# Patient Record
Sex: MALE
[De-identification: names, ages, dates, MRNs, and addresses within clinical notes are randomized per-mention and may not be internally consistent; named-entity substitution may affect disease eponyms.]

---

## 2019-01-01 ENCOUNTER — HOSPITAL ENCOUNTER (EMERGENCY)
Dept: HOSPITAL 31 - C.ER | Age: 44
Discharge: HOME | End: 2019-01-01
Payer: MEDICAID

## 2019-01-01 VITALS — SYSTOLIC BLOOD PRESSURE: 113 MMHG | DIASTOLIC BLOOD PRESSURE: 74 MMHG | HEART RATE: 87 BPM

## 2019-01-01 VITALS — TEMPERATURE: 97.3 F

## 2019-01-01 VITALS — RESPIRATION RATE: 18 BRPM

## 2019-01-01 DIAGNOSIS — F20.9: ICD-10-CM

## 2019-01-01 DIAGNOSIS — F19.10: Primary | ICD-10-CM

## 2019-01-01 DIAGNOSIS — F31.9: ICD-10-CM

## 2019-01-01 DIAGNOSIS — E78.5: ICD-10-CM

## 2019-01-01 NOTE — C.PDOC
History Of Present Illness


43 year old male is brought in by EMS after being found unconscious at his 

mother's house PTA. As per family, who is present in the ED, patient was found 

unconscious in the bathroom with a heroin needle in his arm. Patient has a 

history of heroin abuse. 


Time Seen by Provider: 01/01/19 13:39


Chief Complaint (Nursing): Substance Abuse


History Per: Family


History/Exam Limitations: clinical condition


Onset/Duration Of Symptoms: Hrs


Current Symptoms Are (Timing): Still Present


Modifying Factor(s): Other (heroin)





Past Medical History


Reviewed: Historical Data, Nursing Documentation, Vital Signs


Vital Signs: 





                                Last Vital Signs











Temp  97.3 F L  01/01/19 13:33


 


Pulse  83   01/01/19 15:47


 


Resp  18   01/01/19 15:47


 


BP  107/69   01/01/19 15:47


 


Pulse Ox  96   01/01/19 15:47














- Medical History


PMH: Asthma, Bipolar Disorder, Depression, Hyperlipidemia, Schizophrenia, 

Seizures


   Denies: Chronic Kidney Disease


Surgical History: No Surg Hx





- [facility] Procedures











GROUP PSYCHOTHERAPY (01/19/16)


MEDICATION MANAGEMENT (01/19/16)








Family History: States: No Known Family Hx





- Social History


Hx Alcohol Use: No


Hx Substance Use: Yes





- Immunization History


Hx Tetanus Toxoid Vaccination: No


Hx Influenza Vaccination: No


Hx Pneumococcal Vaccination: No





Review Of Systems


Review Of Systems: ROS cannot be obtained secondary to pt's inabilty to answer 

questions.





Physical Exam





- Physical Exam


Appears: Well, Non-toxic, Other (arousable with painful stimuli)


Skin: Normal Color, Warm, Dry


Head: Atraumatic, Normacephalic


Eye(s): bilateral: Other (pupils pinpoint)


Nose: Normal


Oral Mucosa: Moist


Neck: Normal, Supple


Chest: Symmetrical, No Tenderness


Cardiovascular: Rhythm Regular, No Murmur


Respiratory: Normal Breath Sounds, No Rales, No Rhonchi, No Wheezing


Gastrointestinal/Abdominal: Soft, No Tenderness, No Guarding, No Rebound


Extremity: Normal ROM


Neurological/Psych: Oriented x3





ED Course And Treatment


O2 Sat by Pulse Oximetry: 96 (RA)


Pulse Ox Interpretation: Normal





Disposition





- Disposition


Referrals: 


 Service [Outside]


[facility] [Outside]


Disposition: HOME/ ROUTINE


Disposition Time: 18:20


Condition: IMPROVED


Additional Instructions: 





MACY CAMACHO, thank you for letting us take care of you today. Your provider 

was Lm Junior DO and you were treated for SUBSTANCE ABUSE. The emergency

 medical care you received today was directed at your acute symptoms. If you 

were prescribed any medication, please fill it and take as directed. It may take

 several days for your symptoms to resolve. Return to the Emergency Department 

if your symptoms worsen, do not improve, or if you have any other problems.





Please contact your doctor or call one of the physicians/clinics you have been 

referred to that are listed on the Patient Visit Information form that is 

included in your discharge packet. Bring any paperwork you were given at 

discharge with you along with any medications you are taking to your follow up 

visit. Our treatment cannot replace ongoing medical care by a primary care 

provider outside of the emergency department.





Thank you for allowing the [facility] team to be part of your care today.

















Follow up with your primary care doctor or the clinic this week for outpatient 

care.


Instructions:  Drug Abuse and Drug Addiction (DC)


Forms:  [facility] (English)





- Clinical Impression


Clinical Impression: 


 Drug abuse








- Scribe Statement


The provider has reviewed the documentation as recorded by the Scribe (Vinny Roberts)


Provider Attestation: 


All medical record entries made by the Scribe were at my direction and 

personally dictated by me. I have reviewed the chart and agree that the record 

accurately reflects my personal performance of the history, physical exam, 

medical decision making, and the department course for this patient. I have also

 personally directed, reviewed, and agree with the discharge instructions and 

disposition.

## 2019-01-02 VITALS — OXYGEN SATURATION: 96 %
